# Patient Record
Sex: MALE | Race: WHITE | NOT HISPANIC OR LATINO | Employment: STUDENT | URBAN - METROPOLITAN AREA
[De-identification: names, ages, dates, MRNs, and addresses within clinical notes are randomized per-mention and may not be internally consistent; named-entity substitution may affect disease eponyms.]

---
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
## 2025-02-12 NOTE — PROGRESS NOTES
ALLERGY & IMMUNOLOGY CLINIC -  Established Patient     HISTORY OF PRESENT ILLNESS     Patient ID: Odell Melendez is a 19 y.o. male    CC: follow up visit    HPI: Odell Melendez is a 19 y.o. male presents for evaluation of:    Office Visit 02/12/2025 11/22/2024  Normal state of health until yesterday when outside working with his father. Consumed protein shake (Vegan-Contains pea proteins, pumpkin seed, broccoli, spinach, kale, monk fruit) and experienced sensation of throat swelling up (Was not actually enlarged) followed by nausea and vomiting for 2 hours there after. Denies urticaria, coughing, wheezing, shortness of breath. No EpiPen usage. Does avoid tree nuts; previously had peanut component testing performed so was unlabeled peanut allergy with Dr. Efren Seymour. Still avoids peanuts.      Previously established care for fire ant and environmental allergen immunotherapy for 10-11+ years. Outgrew most food allergies since childhood. Atopic dermatitis now improved. Uses albuterol as needed for allergic asthma.      Last Visit with Dr. Efren Seymour 2019  HPI  Odell is a 14 y.o. 3 m.o.  male here for a return visit to discuss continuing his inhalant immunotherapy and follow up on his atopic disorders.   The history was obtained from the mother and patient.   A prior visit to this clinic last formally occurred 12/10/18; seen regularly for SCIT monthly and for a formal visit at least annually since 7/05/12 at which time he had recently completed induction of Rush Desensitization to inhalant allergens. Since then he has come to clinic on a regular basis for his immunotherapy and generally has done very very well. He was not able to get a full maintenance dose for quite some time; at this point he has completed nearly 6 years of maintenance IT to inhalants.   The PCP is Yasmani Duque.     Odell is a young man we have been following since November 2006 when he presented as a toddler with milk, egg, and peanut allergy as  well as significant atopic dermatitis. Subsequently he anaphylaxed to a fire ant sting in 2007 and underwent rush desensitization to fire ants in May 2008. He was on maintenance immunotherapy to fire ants from that time with his last dose given in May 2013. In the past his atopic dermatitis had noticably flared the worst over the summers with grass exposure. Rush immunotherapy to inhalant allergens was therefore started 6/18/12 with a very marked improvement in his atopic dermatitis and allergic rhinitis. He is seen today prior to receiving his scheduled immunotherapy (SCIT) dose for a scheduled check up as he will need to start a new vial at the next visit if his family wishes to continue.     INTERIM HX DEC 2018 - NOV 2019  Meds:Doing very well on his SCIT. Not on regular antihistamines, no controller, no albuterol in the past year (a year ago just used for PE). Growth hormone is used regularly.  Nose: Really no symptoms.  Lungs: No cough or wheeze as above.  Skin: no recent flares of atopic dermatitis, no recent topical steroids.  Foods: tolerates all.  GI/GERD: No N/V/C/D or GERD  Infections/antibiotics: none recently  Dust Mite Avoidance/Pet exposure: multiple pets  Flu Vaccine: No  New Issues: Will have scoliosis surgery in early January. Has grown 6 inched in a year with the growth hormone injections which he does completely independently  School/Social: 9th grade!      REVIEW OF SYSTEMS     CONST: no F/C/NS, no unintentional weight changes  Balance of review of systems negative except as mentioned above     MEDICAL HISTORY     MedHx: active problems reviewed  SurgHx:   Past Surgical History:   Procedure Laterality Date    FUSION OF SPINE WITH INSTRUMENTATION Left 1/8/2020    Procedure: FUSION, SPINE, WITH INSTRUMENTATION-VBT Abimael, dual lumen tube Left T10-L3;  Surgeon: Rafi Lezama MD;  Location: [facility] OR [address] [place] [address];  Service: Orthopedics;  Laterality: Left;    FUSION OF SPINE WITH INSTRUMENTATION Right  10/28/2020    Procedure: FUSION, SPINE, WITH INSTRUMENTATION-VBT; T5-T11;  Surgeon: Rafi Lezama MD;  Location: [facility] OR [address] [place] [address];  Service: Orthopedics;  Laterality: Right;    VIDEO-ASSISTED THORACOSCOPIC SURGERY (VATS) N/A 1/8/2020    Procedure: VATS (VIDEO-ASSISTED THORACOSCOPIC SURGERY);  Surgeon: Anna Rai MD;  Location: [facility] OR [address] [place] [address];  Service: Pediatrics;  Laterality: N/A;    VIDEO-ASSISTED THORACOSCOPIC SURGERY (VATS) Right 10/28/2020    Procedure: VATS (VIDEO-ASSISTED THORACOSCOPIC SURGERY);  Surgeon: Anna Rai MD;  Location: [facility] OR [address] [place] [address];  Service: Pediatrics;  Laterality: Right;     Allergies: see below  Medications: MAR reviewed    No pertinent allergy changes in medical history since last visit     PHYSICAL EXAM     VS: There were no vitals taken for this visit.  GENERAL: awake, alert, cooperative with exam  EYES: PERRL, EOMI, no conjunctival injection, no discharge, no infraorbital shiners  EARS: external auditory canals normal B/L, TM normal B/L  NOSE: NT ***+ and *** B/L, ***stringing mucous, no polyps  ORAL: MMM, no ulcers, no thrush, no cobblestoning  NECK: no cervical or submandibular LAD  LUNGS: CTAB, no w/r/c, no increased WOB  HEART: Normal Rate and regular rhythm, normal S1/S2, no m/g/r  EXTREMITIES: +2 distal pulses, no c/c/e  DERM: no rashes, no skin breaks  NEURO: normal gait, no facial asymmetry     LABORATORY/ALLERGY Evaluation     Component      Latest Ref Rn 11/22/2024   Green Pea      <0.10 kU/L 0.38 (H)    Green Pea Class CLASS 1    Allergen Broccoli IgE      <0.10 kU/L 0.68 (H)    Allergen Broccoli Class CLASS 1    Spinach, IgE      <0.10 kU/L 0.79 (H)    Spinach Class CLASS 2       Legend:  (H) High     CHART REVIEW     ***     ASSESSMENT/PLAN     Odell Melendez is a 19 y.o. male with       No diagnosis found.      Follow up: ***      Daren Garcia MD    I spent a total of *** minutes on the day of the visit. This includes face to face time and non-face  to face time preparing to see the patient (eg, review of tests), obtaining and/or reviewing separately obtained history, documenting clinical information in the electronic or other health record, independently interpreting results and communicating results to the patient/family/caregiver, or care coordinator.

## 2025-02-13 ENCOUNTER — OFFICE VISIT (OUTPATIENT)
Dept: ALLERGY | Facility: CLINIC | Age: 20
End: 2025-02-13
Payer: COMMERCIAL

## 2025-02-13 ENCOUNTER — LAB VISIT (OUTPATIENT)
Dept: LAB | Facility: HOSPITAL | Age: 20
End: 2025-02-13
Attending: STUDENT IN AN ORGANIZED HEALTH CARE EDUCATION/TRAINING PROGRAM
Payer: COMMERCIAL

## 2025-02-13 VITALS — BODY MASS INDEX: 38.79 KG/M2 | HEART RATE: 70 BPM | OXYGEN SATURATION: 97 % | HEIGHT: 66 IN | WEIGHT: 241.38 LBS

## 2025-02-13 DIAGNOSIS — Z91.018 FOOD ALLERGY: ICD-10-CM

## 2025-02-13 DIAGNOSIS — Z91.018 FOOD ALLERGY: Primary | ICD-10-CM

## 2025-02-13 PROCEDURE — 99999 PR PBB SHADOW E&M-EST. PATIENT-LVL III: CPT | Mod: PBBFAC,,, | Performed by: STUDENT IN AN ORGANIZED HEALTH CARE EDUCATION/TRAINING PROGRAM

## 2025-02-13 PROCEDURE — 99215 OFFICE O/P EST HI 40 MIN: CPT | Mod: S$GLB,,, | Performed by: STUDENT IN AN ORGANIZED HEALTH CARE EDUCATION/TRAINING PROGRAM

## 2025-02-13 PROCEDURE — 3008F BODY MASS INDEX DOCD: CPT | Mod: CPTII,S$GLB,, | Performed by: STUDENT IN AN ORGANIZED HEALTH CARE EDUCATION/TRAINING PROGRAM

## 2025-02-13 PROCEDURE — G2211 COMPLEX E/M VISIT ADD ON: HCPCS | Mod: S$GLB,,, | Performed by: STUDENT IN AN ORGANIZED HEALTH CARE EDUCATION/TRAINING PROGRAM

## 2025-02-13 PROCEDURE — 82785 ASSAY OF IGE: CPT | Performed by: STUDENT IN AN ORGANIZED HEALTH CARE EDUCATION/TRAINING PROGRAM

## 2025-02-13 PROCEDURE — 86003 ALLG SPEC IGE CRUDE XTRC EA: CPT | Performed by: STUDENT IN AN ORGANIZED HEALTH CARE EDUCATION/TRAINING PROGRAM

## 2025-02-13 PROCEDURE — 86003 ALLG SPEC IGE CRUDE XTRC EA: CPT | Mod: 59 | Performed by: STUDENT IN AN ORGANIZED HEALTH CARE EDUCATION/TRAINING PROGRAM

## 2025-02-13 NOTE — PROGRESS NOTES
ALLERGY & IMMUNOLOGY CLINIC -  Established Patient     HISTORY OF PRESENT ILLNESS     Patient ID: Odell Melendez is a 19 y.o. male    CC: follow up visit    HPI: Odell Melendez is a 19 y.o. male presents for evaluation of:    Office Visit 02/13/2025  Food allergy: Continues follow up for evaluation of food allergy. Currently trying to diet and incorporate healthier options into diet including almonds, cauliflower and coconut which compromise multiple ingredients. Concerned regarding introduction given recent episode of anaphylaxis and previous episodes of anaphylaxis.     11/22/2024  Normal state of health until yesterday when outside working with his father. Consumed protein shake (Vegan-Contains pea proteins, pumpkin seed, broccoli, spinach, kale, monk fruit) and experienced sensation of throat swelling up (Was not actually enlarged) followed by nausea and vomiting for 2 hours there after. Denies urticaria, coughing, wheezing, shortness of breath. No EpiPen usage. Does avoid tree nuts; previously had peanut component testing performed so was unlabeled peanut allergy with Dr. Efren Seymour. Still avoids peanuts.      Previously established care for fire ant and environmental allergen immunotherapy for 10-11+ years. Outgrew most food allergies since childhood. Atopic dermatitis now improved. Uses albuterol as needed for allergic asthma.      Last Visit with Dr. Efren Seymour 2019  HPI  Odell is a 14 y.o. 3 m.o.  male here for a return visit to discuss continuing his inhalant immunotherapy and follow up on his atopic disorders.   The history was obtained from the mother and patient.   A prior visit to this clinic last formally occurred 12/10/18; seen regularly for SCIT monthly and for a formal visit at least annually since 7/05/12 at which time he had recently completed induction of Rush Desensitization to inhalant allergens. Since then he has come to clinic on a regular basis for his immunotherapy and generally has done  very very well. He was not able to get a full maintenance dose for quite some time; at this point he has completed nearly 6 years of maintenance IT to inhalants.   The PCP is Yasmani Duque.     Odell is a young man we have been following since November 2006 when he presented as a toddler with milk, egg, and peanut allergy as well as significant atopic dermatitis. Subsequently he anaphylaxed to a fire ant sting in 2007 and underwent rush desensitization to fire ants in May 2008. He was on maintenance immunotherapy to fire ants from that time with his last dose given in May 2013. In the past his atopic dermatitis had noticably flared the worst over the summers with grass exposure. Rush immunotherapy to inhalant allergens was therefore started 6/18/12 with a very marked improvement in his atopic dermatitis and allergic rhinitis. He is seen today prior to receiving his scheduled immunotherapy (SCIT) dose for a scheduled check up as he will need to start a new vial at the next visit if his family wishes to continue.     INTERIM HX DEC 2018 - NOV 2019  Meds:Doing very well on his SCIT. Not on regular antihistamines, no controller, no albuterol in the past year (a year ago just used for PE). Growth hormone is used regularly.  Nose: Really no symptoms.  Lungs: No cough or wheeze as above.  Skin: no recent flares of atopic dermatitis, no recent topical steroids.  Foods: tolerates all.  GI/GERD: No N/V/C/D or GERD  Infections/antibiotics: none recently  Dust Mite Avoidance/Pet exposure: multiple pets  Flu Vaccine: No  New Issues: Will have scoliosis surgery in early January. Has grown 6 inched in a year with the growth hormone injections which he does completely independently  School/Social: 9th grade!      REVIEW OF SYSTEMS     CONST: no F/C/NS, no unintentional weight changes  Balance of review of systems negative except as mentioned above     MEDICAL HISTORY     MedHx: active problems reviewed  SurgHx:   Past Surgical  "History:   Procedure Laterality Date    FUSION OF SPINE WITH INSTRUMENTATION Left 1/8/2020    Procedure: FUSION, SPINE, WITH INSTRUMENTATION-VBT Abimael, dual lumen tube Left T10-L3;  Surgeon: Rafi Lezama MD;  Location: [facility] OR [address] [place] [address];  Service: Orthopedics;  Laterality: Left;    FUSION OF SPINE WITH INSTRUMENTATION Right 10/28/2020    Procedure: FUSION, SPINE, WITH INSTRUMENTATION-VBT; T5-T11;  Surgeon: Rafi Lezama MD;  Location: [facility] OR [address] [place] [address];  Service: Orthopedics;  Laterality: Right;    VIDEO-ASSISTED THORACOSCOPIC SURGERY (VATS) N/A 1/8/2020    Procedure: VATS (VIDEO-ASSISTED THORACOSCOPIC SURGERY);  Surgeon: Anna Rai MD;  Location: [facility] OR [address] [place] [address];  Service: Pediatrics;  Laterality: N/A;    VIDEO-ASSISTED THORACOSCOPIC SURGERY (VATS) Right 10/28/2020    Procedure: VATS (VIDEO-ASSISTED THORACOSCOPIC SURGERY);  Surgeon: Anna Rai MD;  Location: [address];  Service: Pediatrics;  Laterality: Right;     Allergies: see below  Medications: MAR reviewed    No pertinent allergy changes in medical history since last visit     PHYSICAL EXAM     VS: Pulse 70   Ht 5' 5.5" (1.664 m)   Wt 109.5 kg (241 lb 6.5 oz)   SpO2 97%   BMI 39.56 kg/m²   GENERAL: awake, alert, cooperative with exam  LUNGS: CTAB, no w/r/c, no increased WOB  HEART: Normal Rate and regular rhythm, normal S1/S2, no m/g/r  EXTREMITIES: +2 distal pulses, no c/c/e  DERM: no rashes, no skin breaks     LABORATORY/ALLERGY Evaluation     Component      Latest Ref Rng 11/22/2024   Green Pea      <0.10 kU/L 0.38 (H)    Green Pea Class CLASS 1    Allergen Broccoli IgE      <0.10 kU/L 0.68 (H)    Allergen Broccoli Class CLASS 1    Spinach, IgE      <0.10 kU/L 0.79 (H)    Spinach Class CLASS 2          ASSESSMENT/PLAN     Odell Melendez is a 19 y.o. male with       1. Food allergy      Serum Specific IgE levels today; has UTD EpiPen  Discussed FDA Approval for multiple food allergies   Consider in office observed challenges " for gradual re-introduction and expansion of diet    Follow up: Pending results      Daren Garcia MD    I spent a total of 40 minutes on the day of the visit. This includes face to face time and non-face to face time preparing to see the patient (eg, review of tests), obtaining and/or reviewing separately obtained history, documenting clinical information in the electronic or other health record, independently interpreting results and communicating results to the patient/family/caregiver, or care coordinator.

## 2025-02-14 LAB — IGE SERPL-ACNC: 1636 IU/ML (ref 0–100)

## 2025-02-18 ENCOUNTER — PATIENT MESSAGE (OUTPATIENT)
Dept: ALLERGY | Facility: CLINIC | Age: 20
End: 2025-02-18
Payer: COMMERCIAL

## 2025-02-18 LAB
ALMOND IGE QN: 0.81 KU/L
CAULIFLOWER IGE QN: 0.87 KU/L
COCONUT IGE QN: 0.68 KU/L
RAST CLASS: ABNORMAL

## 2025-03-22 PROBLEM — F84.0 AUTISM: Status: ACTIVE | Noted: 2025-03-22

## 2025-03-22 PROBLEM — J45.909 REACTIVE AIRWAY DISEASE IN PEDIATRIC PATIENT: Status: ACTIVE | Noted: 2025-03-22

## 2025-04-30 RX ORDER — MOMETASONE FUROATE 1 MG/G
CREAM TOPICAL DAILY
Qty: 45 G | Refills: 3 | Status: SHIPPED | OUTPATIENT
Start: 2025-04-30 | End: 2025-05-14

## 2025-04-30 RX ORDER — CLOBETASOL PROPIONATE 0.5 MG/G
CREAM TOPICAL DAILY
Qty: 15 G | Refills: 3 | Status: SHIPPED | OUTPATIENT
Start: 2025-04-30 | End: 2025-05-14

## (undated) DEVICE — TRAY FOLEY 16FR INFECTION CONT

## (undated) DEVICE — DRESSING TEGADERM 4.4X5IN

## (undated) DEVICE — DURAPREP SURG SCRUB 26ML

## (undated) DEVICE — ELECTRODE REM PLYHSV RETURN 9

## (undated) DEVICE — BLADE 4IN EDGE INSULATED

## (undated) DEVICE — DRAPE C ARM 42 X 120 10/BX

## (undated) DEVICE — DRESSING AQUACEL FOAM 5 X 5

## (undated) DEVICE — SUT 3-0 VICRYL / RB-1

## (undated) DEVICE — SEE MEDLINE ITEM 157117

## (undated) DEVICE — SEE MEDLINE ITEM 156905

## (undated) DEVICE — TROCAR THORACIC 15MM W/OBT & S

## (undated) DEVICE — DIFFUSER

## (undated) DEVICE — SPONGE DERMACEA 4X4IN 12PLY

## (undated) DEVICE — SUCTION FRAZIER TIP SURG 12FR

## (undated) DEVICE — DRAPE STERI INSTRUMENT 1018

## (undated) DEVICE — KIT EVACUATOR 3-SPRING 1/8 DRN

## (undated) DEVICE — ADHESIVE MASTISOL VIAL 48/BX

## (undated) DEVICE — MARKER SKIN STND TIP BLUE BARR

## (undated) DEVICE — SEE MEDLINE ITEM 154981

## (undated) DEVICE — SEE MEDLINE ITEM 146313

## (undated) DEVICE — SUT MCRYL PLUS 4-0 PS2 27IN

## (undated) DEVICE — SEE MEDLINE ITEM 157150

## (undated) DEVICE — SOL NACL 0.45% 1000ML BG

## (undated) DEVICE — EVACUATOR WOUND BULB 100CC

## (undated) DEVICE — TROCAR ENDOPATH XCEL 5MM 7.5CM

## (undated) DEVICE — BOVIE SUCTION

## (undated) DEVICE — ELECTRODE NEEDLE 2.8IN

## (undated) DEVICE — NDL ECLIPSE SAFETY 18GX1-1/2IN

## (undated) DEVICE — KIT IRR SUCTION HND PIECE

## (undated) DEVICE — DRAIN BLAKE HUBLS 10F RD

## (undated) DEVICE — CLOSURE SKIN STERI STRIP 1/2X4

## (undated) DEVICE — DRESSING TRANS 8X12 TEGADERM

## (undated) DEVICE — DRESSING MEPILEX BORDER 4 X 4

## (undated) DEVICE — BUR BONE CUT MICRO TPS 3X3.8MM

## (undated) DEVICE — KIT ANTIFOG

## (undated) DEVICE — DRAPE OPTIMA MAJOR PEDIATRIC

## (undated) DEVICE — DRESSING TRANS 4X4 TEGADERM

## (undated) DEVICE — IRRIGATOR ENDOSCOPY DISP.

## (undated) DEVICE — SUT PROLENE 2-0 30 SH

## (undated) DEVICE — DRAPE STERI-DRAPE 1000 17X11IN

## (undated) DEVICE — SUT VICRYL+ 1 CT1 18IN

## (undated) DEVICE — SOL IRR NACL .9% 3000ML

## (undated) DEVICE — SEE MEDLINE ITEM 157148

## (undated) DEVICE — ADHESIVE DERMABOND ADVANCED

## (undated) DEVICE — BLADE ELECTRO EDGE INSULATED

## (undated) DEVICE — GAUZE SPONGE PEANUT STRL

## (undated) DEVICE — KIT SURGIFLO HEMOSTATIC MATRIX

## (undated) DEVICE — DRESSING AQUACEL SACRAL 9 X 9

## (undated) DEVICE — TROCAR ENDOPATH XCEL 12X100MM

## (undated) DEVICE — TROCAR ENDOPATH EXCEL

## (undated) DEVICE — TROCAR ENDOPATH XCEL 15MM 10CM

## (undated) DEVICE — Device

## (undated) DEVICE — SUT MONOCRYL 5-0 P-3 UND 18

## (undated) DEVICE — DRAPE C-ARMOR EQUIPMENT COVER

## (undated) DEVICE — SPONGE IV DRAIN 4X4 STERILE

## (undated) DEVICE — DISSECTOR 5MM ENDOPATH

## (undated) DEVICE — SET DECANTER MEDICHOICE

## (undated) DEVICE — GOWN SURGICAL X-LARGE

## (undated) DEVICE — KIT SPINAL PATIENT CARE JACK

## (undated) DEVICE — SUT VICRYL CTD 2-0 GI 27 SH

## (undated) DEVICE — TUBING HF INSUFFLATION W/ FLTR

## (undated) DEVICE — GAUZE SPONGE 4X4 12PLY